# Patient Record
Sex: MALE | Race: WHITE | NOT HISPANIC OR LATINO | Employment: STUDENT | URBAN - METROPOLITAN AREA
[De-identification: names, ages, dates, MRNs, and addresses within clinical notes are randomized per-mention and may not be internally consistent; named-entity substitution may affect disease eponyms.]

---

## 2017-05-01 ENCOUNTER — TELEPHONE (OUTPATIENT)
Dept: FAMILY MEDICINE | Facility: CLINIC | Age: 22
End: 2017-05-01

## 2017-05-01 NOTE — TELEPHONE ENCOUNTER
----- Message from Irena Machado sent at 5/1/2017 10:22 AM CDT -----  Contact: Patient's Dad, Willie Tapia is a patient of Dr. De La Vega and would like to have you see his son. Son is also Medicaid. I explained  is not taking new patients but he insisted I ask. Please call Willie at [phone] to discuss this.

## 2017-05-09 ENCOUNTER — TELEPHONE (OUTPATIENT)
Dept: FAMILY MEDICINE | Facility: CLINIC | Age: 22
End: 2017-05-09

## 2017-05-09 NOTE — TELEPHONE ENCOUNTER
----- Message from Kory Elias sent at 5/9/2017  1:24 PM CDT -----  Contact: pt's father  He's calling in regards to seeing this pt with Medicaid, please advise, [phone] (cell)

## 2017-06-13 PROBLEM — M67.431 GANGLION, RIGHT WRIST: Status: ACTIVE | Noted: 2017-06-13

## 2017-06-21 ENCOUNTER — TELEPHONE (OUTPATIENT)
Dept: FAMILY MEDICINE | Facility: CLINIC | Age: 22
End: 2017-06-21

## 2017-06-21 NOTE — TELEPHONE ENCOUNTER
Mita De Paz canceled todays appt.   Pt informed.   Pt's father was told by MITA De Paz that she will see him as a pt.   Pt is medicaid and I was not able to yandel.   Please help in resching the pt.

## 2017-06-22 PROBLEM — M67.431 GANGLION CYST OF VOLAR ASPECT OF RIGHT WRIST: Status: ACTIVE | Noted: 2017-06-22

## 2017-06-22 NOTE — TELEPHONE ENCOUNTER
Diana, per discussion with Azael yesterday we are unable to work the patient in sooner for a routine appointment. He will have to see her at the first available.

## 2017-09-01 ENCOUNTER — PATIENT OUTREACH (OUTPATIENT)
Dept: ADMINISTRATIVE | Facility: HOSPITAL | Age: 22
End: 2017-09-01

## 2017-09-01 NOTE — LETTER
September 1, 2017    Fredricketta Nguyen  [address] [facility] [address]             [facility]  [address]  [facility] [address]  Phone: [phone] Dear Mr. Nguyen:    [facility] is committed to your overall health.  To help you get the most out of each of your visits, we will review your information to make sure you are up to date on all of your recommended tests and/or procedures.      [facility] has found that you may be due for your fasting cholesterol labs and a pneumonia immunization.     Medicare does not cover all immunizations to be given in the clinic.  Check your benefits to ensure that you do not need to receive your immunizations at the pharmacy.    If you have had any of the above done at another facility, please bring the records or information with you so that your record at [facility] will be complete.  If you would like to schedule any of these, please contact me.    If you are currently taking medication, please bring it with you to your appointment for review.    If you have any questions or concerns, please don't hesitate to call.    Thank you for letting us care for you,  Kathryn Saldaña LPN Clinical Care Coordinator  [facility]  [phone]

## 2018-01-19 ENCOUNTER — TELEPHONE (OUTPATIENT)
Dept: FAMILY MEDICINE | Facility: CLINIC | Age: 23
End: 2018-01-19

## 2018-01-19 NOTE — TELEPHONE ENCOUNTER
----- Message from Agata Jhaveri sent at 1/19/2018  1:16 PM CST -----  Contact: father, Daviezheng Lee  Patient's father, Noemi Calixmonyok calling to schedule an appointment. Father states that Guerline De Paz said she would accept him as a patient. Please call patient at [phone]. Thanks!

## 2018-01-19 NOTE — TELEPHONE ENCOUNTER
Spoke to pt's father, pt has been scheduled two different times, but canceled.   Father states he has [employer].     Member ID [id number]  Health plan # [id number]  Informed him I will have to check with Guerline and call back if she is accepting new pts.

## 2018-01-22 ENCOUNTER — OFFICE VISIT (OUTPATIENT)
Dept: FAMILY MEDICINE | Facility: CLINIC | Age: 23
End: 2018-01-22
Payer: MEDICAID

## 2018-01-22 VITALS
TEMPERATURE: 99 F | BODY MASS INDEX: 22.91 KG/M2 | SYSTOLIC BLOOD PRESSURE: 108 MMHG | RESPIRATION RATE: 12 BRPM | DIASTOLIC BLOOD PRESSURE: 56 MMHG | HEIGHT: 74 IN | WEIGHT: 178.56 LBS | HEART RATE: 56 BPM

## 2018-01-22 DIAGNOSIS — Z00.00 ANNUAL PHYSICAL EXAM: Primary | ICD-10-CM

## 2018-01-22 DIAGNOSIS — Z00.00 LABORATORY EXAM ORDERED AS PART OF ROUTINE GENERAL MEDICAL EXAMINATION: ICD-10-CM

## 2018-01-22 DIAGNOSIS — J45.909 MODERATE ASTHMA, UNSPECIFIED WHETHER COMPLICATED, UNSPECIFIED WHETHER PERSISTENT: ICD-10-CM

## 2018-01-22 PROCEDURE — 99385 PREV VISIT NEW AGE 18-39: CPT | Mod: S$GLB,,, | Performed by: NURSE PRACTITIONER

## 2018-01-22 RX ORDER — BUDESONIDE AND FORMOTEROL FUMARATE DIHYDRATE 80; 4.5 UG/1; UG/1
2 AEROSOL RESPIRATORY (INHALATION) 2 TIMES DAILY
Qty: 10.2 G | Refills: 2 | Status: SHIPPED | OUTPATIENT
Start: 2018-01-22 | End: 2020-01-07 | Stop reason: CLARIF

## 2018-01-22 NOTE — PROGRESS NOTES
"Subjective:       Patient ID: Fredrick Nguyen is a 22 y.o. male.    Chief Complaint: Establish Care    HPI New patient here to establish care. States he is doing well. PMH and PSH reviewed. States he continues to smoke cigarettes daily, no use of Marijuana in a few weeks. He states he has history of asthma but has not had an inhaler in some time now. He denies any specific needs today, states he is just here to establish care. See ROS.    The following portion of the patients history was reviewed and updated as appropriate: allergies, current medications, past medical and surgical history. Past social history and problem list reviewed. Family PMH and Past social history reviewed. Tobacco, Illicit drug use reviewed.     Review of Systems  Constitutional: No fatigue or fever    HENT: no sore throat or nasal congestion. No voice changes    Eyes: No vision changes, blurred vision  Skin: no rashes or lesions  Respiratory:   No SOB, Wheezing, cough  Cardiovascular:   No CP, Palpitations  Gastrointestinal:   No N/V/D. No abdominal pain, weight stable. Appetite good.   Genitourinary:   No dysuria, urgency or frequency. No change in bowels. No blood in stools.   Musculoskeletal:   No joint pain  No change in gait or coordination. .  Neurological:   No dizziness. No headaches  Hematological: No abnormal bruising or bleeding    Psychiatric/Behavioral Negative for suicidal ideas.  Denies feelings of depression. No thoughts of wanting to harm self or others.     Objective:     BP (!) 108/56   Pulse (!) 56   Temp 98.9 °F (37.2 °C) (Oral)   Resp 12   Ht 6' 1.5" (1.867 m)   Wt 81 kg (178 lb 9.2 oz)   BMI 23.24 kg/m²      Physical Exam     Constitutional: oriented to person, place, and time. well-developed and well-nourished.   HENT: normal  Head: Normocephalic.   Eyes: Conjunctivae are normal. Pupils are equal, round, and reactive to light.   Neck: Normal range of motion. Neck supple. No tracheal deviation present. No thyromegaly " present.   Cardiovascular: Normal rate, regular rhythm and normal heart sounds.    Pulmonary/Chest: Effort normal   He has diffuse bilateral end exp wheezing  Abdominal: Soft. Bowel sounds are normal. No distension. There is no tenderness.   Musculoskeletal: Normal range of motion. Gait and coordination normal   Neurological: oriented to person, place, and time.   Skin: Skin is warm and dry. No rashes or lesions  Psychiatric: Normal mood and affect.Behavior is normal. Judgment and thought content normal.   Assessment:       1. Annual physical exam    2. Laboratory exam ordered as part of routine general medical examination    3. Moderate asthma, unspecified whether complicated, unspecified whether persistent        Plan:         Fredrick was seen today for [facility] care.    Diagnoses and all orders for this visit:    Annual physical exam    Laboratory exam ordered as part of routine general medical examination: due for routine labs.  -     Comprehensive metabolic panel; Future  -     Lipid panel; Future    Moderate asthma, unspecified whether complicated, unspecified whether persistent: will place back on inhaler. Use as directed. STOP SMOKING.     Other orders  -     budesonide-formoterol 80-4.5 mcg (SYMBICORT) 80-4.5 mcg/actuation HFAA; Inhale 2 puffs into the lungs 2 (two) times daily. Controller      Healthy diet, exercise  Adequate rest  Adequate hydration  Avoid allergens  Avoid excessive caffeine

## 2018-01-23 ENCOUNTER — TELEPHONE (OUTPATIENT)
Dept: FAMILY MEDICINE | Facility: CLINIC | Age: 23
End: 2018-01-23

## 2018-01-23 DIAGNOSIS — J45.41 MODERATE PERSISTENT ASTHMA WITH ACUTE EXACERBATION: Primary | ICD-10-CM

## 2018-01-23 RX ORDER — ALBUTEROL SULFATE 90 UG/1
2 AEROSOL, METERED RESPIRATORY (INHALATION) EVERY 6 HOURS PRN
Qty: 18 G | Refills: 0 | Status: SHIPPED | OUTPATIENT
Start: 2018-01-23 | End: 2020-01-07 | Stop reason: CLARIF

## 2018-01-23 NOTE — TELEPHONE ENCOUNTER
Spoke to [facility], Insurance limitations states maxium age 13 for symbicort.   No alternatives listed.

## 2018-01-23 NOTE — TELEPHONE ENCOUNTER
Need to find out which inhalers for asthma his insurance will cover, then I will change it.  Please ask the pharmacist or have him call his insurance.

## 2018-01-24 ENCOUNTER — LAB VISIT (OUTPATIENT)
Dept: LAB | Facility: HOSPITAL | Age: 23
End: 2018-01-24
Attending: NURSE PRACTITIONER
Payer: MEDICAID

## 2018-01-24 DIAGNOSIS — Z00.00 LABORATORY EXAM ORDERED AS PART OF ROUTINE GENERAL MEDICAL EXAMINATION: ICD-10-CM

## 2018-01-24 LAB
ALBUMIN SERPL BCP-MCNC: 3.9 G/DL
ALP SERPL-CCNC: 84 U/L
ALT SERPL W/O P-5'-P-CCNC: 10 U/L
ANION GAP SERPL CALC-SCNC: 7 MMOL/L
AST SERPL-CCNC: 12 U/L
BILIRUB SERPL-MCNC: 0.6 MG/DL
BUN SERPL-MCNC: 8 MG/DL
CALCIUM SERPL-MCNC: 9.8 MG/DL
CHLORIDE SERPL-SCNC: 104 MMOL/L
CHOLEST SERPL-MCNC: 114 MG/DL
CHOLEST/HDLC SERPL: 2.9 {RATIO}
CO2 SERPL-SCNC: 29 MMOL/L
CREAT SERPL-MCNC: 0.8 MG/DL
EST. GFR  (AFRICAN AMERICAN): >60 ML/MIN/1.73 M^2
EST. GFR  (NON AFRICAN AMERICAN): >60 ML/MIN/1.73 M^2
GLUCOSE SERPL-MCNC: 105 MG/DL
HDLC SERPL-MCNC: 40 MG/DL
HDLC SERPL: 35.1 %
LDLC SERPL CALC-MCNC: 63 MG/DL
NONHDLC SERPL-MCNC: 74 MG/DL
POTASSIUM SERPL-SCNC: 4.2 MMOL/L
PROT SERPL-MCNC: 7.4 G/DL
SODIUM SERPL-SCNC: 140 MMOL/L
TRIGL SERPL-MCNC: 55 MG/DL

## 2018-01-24 PROCEDURE — 80053 COMPREHEN METABOLIC PANEL: CPT

## 2018-01-24 PROCEDURE — 36415 COLL VENOUS BLD VENIPUNCTURE: CPT | Mod: PO

## 2018-01-24 PROCEDURE — 80061 LIPID PANEL: CPT

## 2018-02-07 ENCOUNTER — TELEPHONE (OUTPATIENT)
Dept: FAMILY MEDICINE | Facility: CLINIC | Age: 23
End: 2018-02-07

## 2018-02-07 NOTE — TELEPHONE ENCOUNTER
"Pt's father stated pt is depressed very sad.  Said his mother came back to town, after being going for many years and left him again.   Pt is speaking, " life is not worth living and that he rather end his life than put up with this stuff",  Father stated pt is blaming himself for all their problems and told him "that he wont have a son long", meaning ending his life.  The father said as we were speaking the pt started to cry and left the room and went outside.   He said they are living in a camper trailer right now.   Informed the father if the pt makes statements of ending life he really needs to call 911.  He stated understanding but dont think it is to that yet, that he is keeping an eye on him.   He is requesting an appointment ASAP.  Please advise.  "

## 2018-02-07 NOTE — TELEPHONE ENCOUNTER
----- Message from RT Regina sent at 2/7/2018  3:16 PM CST -----  Contact: Noemi (Father) [phone]   Noemi (Father) [phone], requesting an appt for the pt to be worked in as soon as possible, appt was added to the wait list, thanks.

## 2018-02-07 NOTE — TELEPHONE ENCOUNTER
I won't be able to work him in until Friday and he needs an extended visit. Can put him in the last two slots of the day but have him come in the morning.  Don't want to wait until end of the day on a Friday.  Until then if he is talking about harming himself he needs to go to [facility] for evaluation and sooner treatment.

## 2018-02-07 NOTE — TELEPHONE ENCOUNTER
Pt father aware pt has appt sched at 9:00 am on Friday. Father advised that if pt has more suicidal thoughts , he needs to call 911 , bring pt to [facility] . Pt father understands .--lp

## 2018-03-19 ENCOUNTER — OFFICE VISIT (OUTPATIENT)
Dept: FAMILY MEDICINE | Facility: CLINIC | Age: 23
End: 2018-03-19
Payer: MEDICAID

## 2018-03-19 VITALS
TEMPERATURE: 99 F | SYSTOLIC BLOOD PRESSURE: 116 MMHG | RESPIRATION RATE: 12 BRPM | DIASTOLIC BLOOD PRESSURE: 58 MMHG | HEART RATE: 60 BPM | BODY MASS INDEX: 23.54 KG/M2 | HEIGHT: 74 IN | WEIGHT: 183.44 LBS

## 2018-03-19 DIAGNOSIS — F32.A DEPRESSION, UNSPECIFIED DEPRESSION TYPE: Primary | ICD-10-CM

## 2018-03-19 DIAGNOSIS — F41.9 ANXIETY: ICD-10-CM

## 2018-03-19 PROCEDURE — 99214 OFFICE O/P EST MOD 30 MIN: CPT | Mod: S$GLB,,, | Performed by: NURSE PRACTITIONER

## 2018-03-19 RX ORDER — ESCITALOPRAM OXALATE 10 MG/1
10 TABLET ORAL DAILY
Qty: 30 TABLET | Refills: 2 | Status: SHIPPED | OUTPATIENT
Start: 2018-03-19 | End: 2018-03-27

## 2018-03-19 NOTE — PROGRESS NOTES
Subjective:       Patient ID: Fredrick Nguyen is a 22 y.o. male.    Chief Complaint: Depression    HPI here for concerns regarding depression. States this is new for him. Had some family issues that triggered this.  He does not have a plan to harm himself. States that his mother who has been absent all of his life has decided that she wants to now be a part of his life. States that she is always putting him down. States that she tells him that he is nothing but trouble. He denies thoughts of killing himself but has the feeling that if something were to happen to him then he would not care. He has never taken medication for depression. States he is not motivated. Feeling more emotional and sad. Crying often. Living with his father. No ETOH or drug use. He feels he would benefit from medication. States he has not smoked any marijuana in months. Feels at times like panic attacks. He does not want to see a therapist at this time. He is working full time. See ROS.    history was reviewed and updated as appropriate: allergies, current medications, past medical and surgical history. Past social history and problem list reviewed. Family PMH and Past social history reviewed. Tobacco, Illicit drug use reviewed.     Review of Systems  Constitutional: No fatigue or fever    HENT: no sore throat or nasal congestion. No voice changes    Eyes: No vision changes, blurred vision  Skin: no rashes or lesions  Respiratory:   No SOB, Wheezing, cough  Cardiovascular:   No CP, Palpitations  Gastrointestinal:   No N/V/D. No abdominal pain, weight stable. Appetite good.   Genitourinary:   No dysuria, urgency or frequency. No change in bowels. No blood in stools.   Musculoskeletal:   No joint pain  No change in gait or coordination. .  Neurological:   No dizziness. No headaches  Hematological: No abnormal bruising or bleeding    Psychiatric/Behavioral Negative for suicidal ideas.  Having increased feelings of depression. No thoughts of wanting  "to harm self or others. crying easily. Worried. See HPI    Objective:     BP (!) 116/58 Comment: manual left arm sitting  Pulse 60   Temp 99 °F (37.2 °C) (Oral)   Resp 12   Ht 6' 1.5" (1.867 m)   Wt 83.2 kg (183 lb 6.8 oz)   BMI 23.87 kg/m²      Physical Exam   Constitutional: oriented to person, place, and time. well-developed and well-nourished.   Cardiovascular: Normal rate, regular rhythm and normal heart sounds.    Pulmonary/Chest: Effort normal and breath sounds normal. No respiratory distress. No wheezes.   Abdominal: Soft. Bowel sounds are normal. No distension. There is no tenderness.   Musculoskeletal: Normal range of motion. Gait and coordination normal   Neurological: oriented to person, place, and time.   Skin: Skin is warm and dry. No rashes or lesions  Psychiatric: Normal mood and affect.Behavior is normal. Judgment and thought content normal. he does not present as a threat to himself or others. He is very direct with what is the cause of all of his emotional upset. States did not have any of these issues until his mother returned.  Assessment:       1. Depression, unspecified depression type    2. Anxiety        Plan:         Ferdrick was seen today for depression.    Diagnoses and all orders for this visit:    Depression, unspecified depression type: will start him on low dose lexapro. Take daily and only as directed. Will follow up in one month for review. If is advised that if increased thoughts of harming himself or others occurs he will stop the medication and let someone know immediately.     Anxiety: lexaro.  Counseling recommended but he wants to defer that for now.    Other orders  -     escitalopram oxalate (LEXAPRO) 10 MG tablet; Take 1 tablet (10 mg total) by mouth once daily.    Continue current medication  Take medications only as prescribed  Healthy diet, exercise  Adequate rest  Adequate hydration  Avoid allergens  Avoid excessive caffeine  "

## 2018-03-26 ENCOUNTER — TELEPHONE (OUTPATIENT)
Dept: FAMILY MEDICINE | Facility: CLINIC | Age: 23
End: 2018-03-26

## 2018-03-26 NOTE — TELEPHONE ENCOUNTER
----- Message from Carolina Chou sent at 3/26/2018 12:29 PM CDT -----  Contact: self  Patient was told to call back and let Dr know if medication is not working     escitalopram oxalate (LEXAPRO) 10 MG tablet    Patient states this medication is not working     Please call to advise [phone]

## 2018-03-26 NOTE — TELEPHONE ENCOUNTER
Spoke with patient. I asked him if he got any relief and he said it might be a bit worse. I explained to him that Guerline was out of the office today and that we would get back with him tomorrow.

## 2018-03-27 ENCOUNTER — TELEPHONE (OUTPATIENT)
Dept: FAMILY MEDICINE | Facility: CLINIC | Age: 23
End: 2018-03-27

## 2018-03-27 RX ORDER — FLUOXETINE HYDROCHLORIDE 20 MG/1
20 CAPSULE ORAL DAILY
Qty: 30 CAPSULE | Refills: 2 | Status: SHIPPED | OUTPATIENT
Start: 2018-03-27 | End: 2018-04-19

## 2018-03-27 NOTE — TELEPHONE ENCOUNTER
Spoke with pt's father and informed him, KATINA De Paz seen his msg and sent new med to [facility]arian and other recommendations.   He verbalized understanding.

## 2018-03-27 NOTE — TELEPHONE ENCOUNTER
----- Message from Anna Jansen sent at 3/27/2018 10:34 AM CDT -----  Contact: Noemi Lee (Father)  Noemi Lee (Father) calling to speak with the Nurse. The patient's depression didn't get better after taking a new medication. He needs something different. Please advise. Call to pod. Call connected to pod. Warm transferred  Call back   Thanks!

## 2018-03-27 NOTE — TELEPHONE ENCOUNTER
"Pt's father stated the depression medication is not working and that the pt is making remarks as:  "I hate my life, I wish I was dead".   Father said he never mentioned suicide thoughts.   Per provider see other msg:    I will change to something different. If he has any thoughts of wanting to harm himself or others then needs to go to the ER.  He needs to call medicaid and ask about mental health providers in the area that take his insurance. He needs to be in counseling.  New medication sent to pharmacy. Let him know it will take several weeks before he will feel any benefits from the medication.  Pt's father verbalized understanding and said he will discuss with pt.  "

## 2018-03-27 NOTE — TELEPHONE ENCOUNTER
I will change to something different. If he has any thoughts of wanting to harm himself or others then needs to go to the ER.  He needs to call medicaid and ask about mental health providers in the area that take his insurance. He needs to be in counseling.  New medication sent to pharmacy. Let him know it will take several weeks before he will feel any benefits from the medication.

## 2018-04-19 ENCOUNTER — OFFICE VISIT (OUTPATIENT)
Dept: FAMILY MEDICINE | Facility: CLINIC | Age: 23
End: 2018-04-19
Payer: MEDICAID

## 2018-04-19 ENCOUNTER — TELEPHONE (OUTPATIENT)
Dept: FAMILY MEDICINE | Facility: CLINIC | Age: 23
End: 2018-04-19

## 2018-04-19 VITALS
DIASTOLIC BLOOD PRESSURE: 46 MMHG | BODY MASS INDEX: 22.12 KG/M2 | WEIGHT: 172.38 LBS | SYSTOLIC BLOOD PRESSURE: 104 MMHG | HEIGHT: 74 IN | HEART RATE: 72 BPM | RESPIRATION RATE: 12 BRPM | TEMPERATURE: 99 F

## 2018-04-19 DIAGNOSIS — F32.2 CURRENT SEVERE EPISODE OF MAJOR DEPRESSIVE DISORDER WITHOUT PSYCHOTIC FEATURES WITHOUT PRIOR EPISODE: Primary | ICD-10-CM

## 2018-04-19 PROCEDURE — 99214 OFFICE O/P EST MOD 30 MIN: CPT | Mod: S$GLB,,, | Performed by: NURSE PRACTITIONER

## 2018-04-19 RX ORDER — SERTRALINE HYDROCHLORIDE 50 MG/1
50 TABLET, FILM COATED ORAL DAILY
Qty: 30 TABLET | Refills: 3 | Status: SHIPPED | OUTPATIENT
Start: 2018-04-19 | End: 2018-06-06

## 2018-04-19 NOTE — PATIENT INSTRUCTIONS
[facility]  [address], [place] [address]  Phone: [phone]  [facility]  [phone]    [facility]  [address], [place] [address]  Phone: [phone]    [facility]  [address]  [place] [address]  Phone: [phone]  Fax: [phone]    [address]  [address]  [place] [address]  Tel: [phone]   Fax: [phone]    [facility]  [address] [place] [address]  Telephone: [phone]    [place] [address] [place] [address]  Phone: [phone]  Fax: [phone]    [facility]  [address], [place] [address]  [phone]

## 2018-04-19 NOTE — TELEPHONE ENCOUNTER
----- Message from Ashley Bowles sent at 4/19/2018 10:05 AM CDT -----  Contact: [phone]  Pt needs a f/u appt in a month 5-19-18 per Guerline.  I am not able to schedule at that time due to his insurance.  Pls call pt to schedule.

## 2018-04-19 NOTE — PROGRESS NOTES
"Subjective:       Patient ID: Fredrick Nguyen is a 22 y.o. male.    Chief Complaint: follow up with depression    HPI here for follow up on depression. He is taking the prozac,. Feels that the depression is getting worse instead of better. Has low self esteem. Crying a lot. He states he is taking it every day. Denies any thoughts of wanting to harm himself. He got in an argument with his best friend yesterday and has been upset about that. He is not working at this time, looking for a job. His father is here with him today and states that he worries and gets upset about trivial things. He feels that he would benefit from see a therapist. Has a lot of issues about his mother.     He wants to try a different depression medication. Will refer to counseling. See ROS.    The following portion of the patients history was reviewed and updated as appropriate: allergies, current medications, past medical and surgical history. Past social history and problem list reviewed. Family PMH and Past social history reviewed. Tobacco, Illicit drug use reviewed.     Review of Systems  Constitutional: No fatigue or fever    Respiratory:   No SOB, Wheezing, cough  Cardiovascular:   No CP, Palpitations  Gastrointestinal:   No N/V/D. No abdominal pain, weight stable. Appetite good.   Genitourinary:   No dysuria, urgency or frequency. No change in bowels. No blood in stools.   Musculoskeletal:   No joint pain  No change in gait or coordination. .  Neurological:   No dizziness. No headaches  Hematological: No abnormal bruising or bleeding    Psychiatric/Behavioral Negative for suicidal ideas. having feelings of depression. No thoughts of wanting to harm self or others.     Objective:     BP (!) 104/46 Comment: maunal left arm sitting, done twice  Pulse 72   Temp 99 °F (37.2 °C) (Oral)   Resp 12   Ht 6' 1.5" (1.867 m)   Wt 78.2 kg (172 lb 6.4 oz)   BMI 22.44 kg/m²      Physical Exam     Constitutional: oriented to person, place, and time. " well-developed and well-nourished.   Cardiovascular: Normal rate, regular rhythm and normal heart sounds.    Pulmonary/Chest: Effort normal and breath sounds normal. No respiratory distress. No wheezes.   Musculoskeletal: Normal range of motion. Gait and coordination normal.   Neurological: oriented to person, place, and time.   Skin: Skin is warm and dry. No rashes or lesions  Psychiatric: Normal mood and affect.Behavior is normal. Judgment and thought content normal. he does not present  As a threat to himself or others.  Assessment:       1. Current severe episode of major depressive disorder without psychotic features without prior episode        Plan:         Fredrick was seen today for follow up with depression.    Diagnoses and all orders for this visit:    Current severe episode of major depressive disorder without psychotic features without prior episode: will refer to psychiatry for evaluation and recommendations. Will change medication to zoloft. Take as directed.   -     Ambulatory referral to Psychiatry    Other orders  -     sertraline (ZOLOFT) 50 MG tablet; Take 1 tablet (50 mg total) by mouth once daily.    OVER 50% OF VISIT SPENT IN REVIEW AND COUNSELING REGARDING DEPRESSION    Continue current medication  Take medications only as prescribed  Healthy diet, exercise  Adequate rest  Adequate hydration  Avoid allergens  Avoid excessive caffeine

## 2018-04-23 ENCOUNTER — TELEPHONE (OUTPATIENT)
Dept: FAMILY MEDICINE | Facility: CLINIC | Age: 23
End: 2018-04-23

## 2018-04-23 NOTE — TELEPHONE ENCOUNTER
----- Message from Lashon Mendiola sent at 4/23/2018 12:47 PM CDT -----  Contact: Roxanne with [facility]  Roxanne with [facility] calling to let Guerline De Paz know that they are not in network with Medicaid, patient needs to be referred to someone in network. Please advise. Thanks.

## 2018-04-23 NOTE — TELEPHONE ENCOUNTER
Let his father know that they need to call  Medicaid and find out which mental health providers are available in his area that take his insurance.

## 2018-05-18 ENCOUNTER — OFFICE VISIT (OUTPATIENT)
Dept: FAMILY MEDICINE | Facility: CLINIC | Age: 23
End: 2018-05-18
Payer: MEDICAID

## 2018-05-18 VITALS
DIASTOLIC BLOOD PRESSURE: 50 MMHG | HEART RATE: 46 BPM | BODY MASS INDEX: 21.46 KG/M2 | OXYGEN SATURATION: 97 % | HEIGHT: 75 IN | TEMPERATURE: 99 F | WEIGHT: 172.63 LBS | SYSTOLIC BLOOD PRESSURE: 102 MMHG

## 2018-05-18 DIAGNOSIS — F41.9 ANXIETY: ICD-10-CM

## 2018-05-18 DIAGNOSIS — F32.A DEPRESSION, UNSPECIFIED DEPRESSION TYPE: Primary | ICD-10-CM

## 2018-05-18 PROCEDURE — 99214 OFFICE O/P EST MOD 30 MIN: CPT | Mod: S$GLB,,, | Performed by: NURSE PRACTITIONER

## 2018-05-21 NOTE — PROGRESS NOTES
"Subjective:       Patient ID: Fredrick Nguyen is a 22 y.o. male.    Chief Complaint: Depression    HPI here for follow up on his depression, anxiety. He states that the medication is finally working well. He has noticed that he is not sitting around worrying and depressed. States he is getting up and being more productive. He is working and hanging out more with his friends. Not crying. He feels he is finally in a good place. He wants to continue with this medication and dosage. He denies thoughts of wanting to harm himself or others. He has an appointment next month with psychiatry. States he is going to keep that appointment just to talk and get some advice. He has no other concerns today. See ROS.    The following portion of the patients history was reviewed and updated as appropriate: allergies, current medications, past medical and surgical history. Past social history and problem list reviewed. Family PMH and Past social history reviewed. Tobacco, Illicit drug use reviewed.     Review of Systems  Constitutional: No fatigue or fever    HENT: no sore throat or nasal congestion. No voice changes    Eyes: No vision changes, blurred vision  Skin: no rashes or lesions  Respiratory:   No SOB, Wheezing, cough  Cardiovascular:   No CP, Palpitations  Gastrointestinal:   No N/V/D. No abdominal pain, weight stable. Appetite good.   Genitourinary:   No dysuria, urgency or frequency. No change in bowels. No blood in stools.   Musculoskeletal:   No joint pain  No change in gait or coordination. .  Neurological:   No dizziness. No headaches  Hematological: No abnormal bruising or bleeding    Psychiatric/Behavioral Negative for suicidal ideas.  Denies feelings of depression. No thoughts of wanting to harm self or others.     Objective:     BP (!) 102/50 (BP Location: Left arm, Patient Position: Sitting, BP Method: Medium (Manual))   Pulse (!) 46   Temp 99 °F (37.2 °C) (Oral)   Ht 6' 2.5" (1.892 m)   Wt 78.3 kg (172 lb 9.6 oz)  "  SpO2 97%   BMI 21.86 kg/m²      Physical Exam     Constitutional: oriented to person, place, and time. well-developed and well-nourished.   Neck: Normal range of motion. Neck supple. No tracheal deviation present. No thyromegaly present.   Cardiovascular: Normal rate, regular rhythm and normal heart sounds.    Pulmonary/Chest: Effort normal and breath sounds normal. No respiratory distress. No wheezes.   Abdominal: Soft. Bowel sounds are normal. No distension. There is no tenderness.   Musculoskeletal: Normal range of motion. No edema.   Neurological: oriented to person, place, and time.   Skin: Skin is warm and dry. No rashes or lesions  Psychiatric: Normal mood and affect.Behavior is normal. Judgment and thought content normal. much better mood. He is doing well. Does not present as a threat to himself or others.  Assessment:       1. Depression, unspecified depression type    2. Anxiety        Plan:         Fredrick was seen today for depression.    Diagnoses and all orders for this visit:    Depression, unspecified depression type: continue current medication and dosage.    Anxiety: doing better. Continue current medication and  Dosage. Keep appt with psychiatry.     Continue current medication  Take medications only as prescribed  Healthy diet, exercise  Adequate rest  Adequate hydration  Avoid allergens  Avoid excessive caffeine

## 2018-06-06 ENCOUNTER — OFFICE VISIT (OUTPATIENT)
Dept: FAMILY MEDICINE | Facility: CLINIC | Age: 23
End: 2018-06-06
Payer: MEDICAID

## 2018-06-06 VITALS
WEIGHT: 167.13 LBS | TEMPERATURE: 98 F | BODY MASS INDEX: 20.78 KG/M2 | HEART RATE: 52 BPM | HEIGHT: 75 IN | SYSTOLIC BLOOD PRESSURE: 124 MMHG | DIASTOLIC BLOOD PRESSURE: 62 MMHG

## 2018-06-06 DIAGNOSIS — K29.70 VIRAL GASTRITIS: Primary | ICD-10-CM

## 2018-06-06 DIAGNOSIS — R53.83 FATIGUE, UNSPECIFIED TYPE: ICD-10-CM

## 2018-06-06 DIAGNOSIS — J02.9 SORE THROAT: ICD-10-CM

## 2018-06-06 DIAGNOSIS — R11.2 NAUSEA AND VOMITING, INTRACTABILITY OF VOMITING NOT SPECIFIED, UNSPECIFIED VOMITING TYPE: ICD-10-CM

## 2018-06-06 DIAGNOSIS — R19.7 DIARRHEA, UNSPECIFIED TYPE: ICD-10-CM

## 2018-06-06 PROBLEM — R00.1 BRADYCARDIA: Status: ACTIVE | Noted: 2018-06-06

## 2018-06-06 PROBLEM — R11.0 NAUSEA: Status: ACTIVE | Noted: 2018-06-06

## 2018-06-06 PROBLEM — R10.9 ABDOMINAL PAIN: Status: ACTIVE | Noted: 2018-06-06

## 2018-06-06 PROBLEM — E86.0 DEHYDRATION: Status: ACTIVE | Noted: 2018-06-06

## 2018-06-06 PROBLEM — A08.4 VIRAL GASTROENTERITIS: Status: ACTIVE | Noted: 2018-06-06

## 2018-06-06 LAB
CTP QC/QA: YES
CTP QC/QA: YES
HETEROPH AB SER QL: NEGATIVE
S PYO RRNA THROAT QL PROBE: NEGATIVE

## 2018-06-06 PROCEDURE — 86308 HETEROPHILE ANTIBODY SCREEN: CPT | Mod: QW,,, | Performed by: NURSE PRACTITIONER

## 2018-06-06 PROCEDURE — 87880 STREP A ASSAY W/OPTIC: CPT | Mod: QW,,, | Performed by: NURSE PRACTITIONER

## 2018-06-06 PROCEDURE — 99214 OFFICE O/P EST MOD 30 MIN: CPT | Mod: S$GLB,,, | Performed by: NURSE PRACTITIONER

## 2018-06-06 RX ORDER — ONDANSETRON 2 MG/ML
4 INJECTION INTRAMUSCULAR; INTRAVENOUS ONCE
Status: SHIPPED | OUTPATIENT
Start: 2018-06-06

## 2018-06-06 RX ORDER — ONDANSETRON 2 MG/ML
4 INJECTION INTRAMUSCULAR; INTRAVENOUS ONCE
Status: DISCONTINUED | OUTPATIENT
Start: 2018-06-06 | End: 2018-06-06 | Stop reason: HOSPADM

## 2018-06-06 RX ADMIN — ONDANSETRON 4 MG: 2 INJECTION INTRAMUSCULAR; INTRAVENOUS at 10:06

## 2018-06-06 NOTE — PROGRESS NOTES
"Subjective:       Patient ID: Fredrick Nguyen is a 22 y.o. male.    Chief Complaint: Emesis    HPI started yesterday with stomach cramping and mild sore throat. Had cramping all night long and some diarrhea. Then this morning he ate breakfast and that started him vomiting. States shaky, cold. Throat is better, just feels irritated. He does not have a fever. He is pale and appears not to feel well. See ROS.    The following portion of the patients history was reviewed and updated as appropriate: allergies, current medications, past medical and surgical history. Past social history and problem list reviewed. Family PMH and Past social history reviewed. Tobacco, Illicit drug use reviewed.     Review of Systems   Constitutional: Positive for chills and fatigue. Negative for fever.   HENT: Positive for sore throat.    Eyes: Negative for visual disturbance.   Respiratory: Negative for cough, shortness of breath and wheezing.    Cardiovascular: Negative for chest pain and palpitations.   Gastrointestinal: Positive for abdominal pain (generalized cramping), diarrhea, nausea and vomiting. Negative for constipation.   Musculoskeletal: Positive for myalgias.   Neurological: Negative for dizziness and headaches.       Objective:     /62   Pulse (!) 52   Temp 98.3 °F (36.8 °C) (Tympanic)   Ht 6' 2.5" (1.892 m)   Wt 75.8 kg (167 lb 1.7 oz)   BMI 21.17 kg/m²      Physical Exam     Constitutional: oriented to person, place, and time. well-developed and well-nourished. Pale, appears not to feel well.  HENT: normal nares. Throat with mild erythema but no exudate. Canals and TM normal  Head: Normocephalic.   Eyes: Conjunctivae are normal. Pupils are equal, round, and reactive to light.   Neck: Normal range of motion. Neck supple. No tracheal deviation present. No thyromegaly present. no enlarged or tender anterior cervical lymph nodes.  Cardiovascular: Normal rate, regular rhythm and normal heart sounds.    Pulmonary/Chest: " Effort normal and breath sounds normal. No respiratory distress. No wheezes.   Abdominal: Soft. Bowel sounds are normal. No distension. There is generalized tenderness. no rebound  Musculoskeletal: Normal range of motion. Gait and coordination normal.   Neurological: oriented to person, place, and time.   Skin: Skin is warm and dry. No rashes or lesions    Assessment:       1. Viral gastritis    2. Nausea and vomiting, intractability of vomiting not specified, unspecified vomiting type    3. Diarrhea, unspecified type    4. Sore throat    5. Fatigue, unspecified type        Plan:         Fredrick was seen today for emesis.    Diagnoses and all orders for this visit:    Viral gastritis: gave zofran IM for the nausea/vomiting. He is advised to rest, hydrate. Try to eat crackers, bland diet and advance as tolerated. If not improving over the next 24 hours let me know. He has zofran at home. Advised he can take that every 4 hours.    Nausea and vomiting, intractability of vomiting not specified, unspecified vomiting type  -     ondansetron injection 4 mg; Inject 4 mg into the muscle once.  -     ondansetron injection 4 mg; Inject 4 mg into the muscle once.    Diarrhea, unspecified type: do not want to give any  Medication for the diarrhea.     Sore throat  -     POCT rapid strep A: NEGATIVE    Results for orders placed or performed in visit on 06/06/18   POCT rapid strep A   Result Value Ref Range    Rapid Strep A Screen Negative Negative     Acceptable Yes    POCT Infectious mononucleosis antibody   Result Value Ref Range    Monospot Negative Negative     Acceptable Yes        Fatigue, unspecified type  -     POCT Infectious mononucleosis antibody: NEGATIVE      Continue current medication  Take medications only as prescribed  Healthy diet  Adequate rest  Adequate hydration  Avoid allergens  Avoid excessive caffeine

## 2018-06-07 PROBLEM — K52.9 COLITIS PRESUMED INFECTIOUS: Status: ACTIVE | Noted: 2018-06-07

## 2018-06-14 ENCOUNTER — OFFICE VISIT (OUTPATIENT)
Dept: FAMILY MEDICINE | Facility: CLINIC | Age: 23
End: 2018-06-14
Payer: MEDICAID

## 2018-06-14 ENCOUNTER — TELEPHONE (OUTPATIENT)
Dept: FAMILY MEDICINE | Facility: CLINIC | Age: 23
End: 2018-06-14

## 2018-06-14 VITALS
DIASTOLIC BLOOD PRESSURE: 64 MMHG | HEIGHT: 75 IN | OXYGEN SATURATION: 99 % | BODY MASS INDEX: 20.47 KG/M2 | TEMPERATURE: 99 F | WEIGHT: 164.63 LBS | RESPIRATION RATE: 18 BRPM | HEART RATE: 57 BPM | SYSTOLIC BLOOD PRESSURE: 115 MMHG

## 2018-06-14 DIAGNOSIS — E86.0 DEHYDRATION: ICD-10-CM

## 2018-06-14 DIAGNOSIS — Z23 IMMUNIZATION DUE: ICD-10-CM

## 2018-06-14 DIAGNOSIS — A09 INFECTIOUS COLITIS: ICD-10-CM

## 2018-06-14 DIAGNOSIS — Z09 HOSPITAL DISCHARGE FOLLOW-UP: Primary | ICD-10-CM

## 2018-06-14 DIAGNOSIS — J30.9 ALLERGIC RHINITIS, UNSPECIFIED SEASONALITY, UNSPECIFIED TRIGGER: ICD-10-CM

## 2018-06-14 PROCEDURE — 90670 PCV13 VACCINE IM: CPT | Mod: S$GLB,,, | Performed by: NURSE PRACTITIONER

## 2018-06-14 PROCEDURE — 99214 OFFICE O/P EST MOD 30 MIN: CPT | Mod: 25,S$GLB,, | Performed by: NURSE PRACTITIONER

## 2018-06-14 PROCEDURE — 90471 IMMUNIZATION ADMIN: CPT | Mod: S$GLB,,, | Performed by: NURSE PRACTITIONER

## 2018-06-14 RX ORDER — MONTELUKAST SODIUM 10 MG/1
TABLET ORAL
Qty: 30 TABLET | Refills: 11 | Status: SHIPPED | OUTPATIENT
Start: 2018-06-14

## 2018-06-14 NOTE — PROGRESS NOTES
"Subjective:       Patient ID: Fredrick Nguyen is a 22 y.o. male.    Chief Complaint: Hospital Follow Up, [facility]    HPI Patient was seen on the 6 for nausea and vomiting.  After his visit here he went to the emergency room and was admitted for dehydration, intractable vomiting, viral gastroenteritis, infectious colitis..  After 2 days he was discharged.  He was rehydrated.  All workup in the hospital was negative.  It was felt he possibly could have had some infectious colitis so was placed on antibiotics.  He is here today for follow-up. All of his symptoms have resolved. He completed the antibiotics that were given.     Having itching to his left eye for several days. Draining, watery. Mild crusting. No vision changes. Sun makes it worse. No eye drops used. Sneezing, runny nose.     The following portion of the patients history was reviewed and updated as appropriate: allergies, current medications, past medical and surgical history. Past social history and problem list reviewed. Family PMH and Past social history reviewed. Tobacco, Illicit drug use reviewed.     Review of Systems   Constitutional: Negative for fatigue and fever.   HENT: Positive for rhinorrhea and sneezing. Negative for sinus pain, sinus pressure and sore throat.    Eyes: Positive for redness and itching. Negative for visual disturbance.   Respiratory: Negative for cough, shortness of breath and wheezing.    Cardiovascular: Negative for chest pain and palpitations.   Gastrointestinal: Negative for abdominal pain, blood in stool, diarrhea, nausea and vomiting.   Musculoskeletal: Negative.    Neurological: Negative for headaches.       Objective:     /64 (BP Location: Left arm, Patient Position: Sitting, BP Method: Large (Manual))   Pulse (!) 57   Temp 98.5 °F (36.9 °C) (Oral)   Resp 18   Ht 6' 3" (1.905 m)   Wt 74.7 kg (164 lb 9.6 oz)   SpO2 99%   BMI 20.57 kg/m²      Physical Exam     Constitutional: oriented to person, place, and time. " well-developed and well-nourished.   HENT: normal nares.  No sinus area tenderness.  Throat clear  Head: Normocephalic.   Eyes: Conjunctivae are normal on right.  Mild irritation and crusting on the left. Pupils are equal, round, and reactive to light.   Neck: Normal range of motion. Neck supple. No tracheal deviation present. No thyromegaly present.   Cardiovascular: Normal rate, regular rhythm and normal heart sounds.    Pulmonary/Chest: Effort normal and breath sounds normal. No respiratory distress. No wheezes.   Abdominal: Soft. Bowel sounds are normal. No distension. There is no tenderness.   Musculoskeletal: Normal range of motion. Gait normal   Assessment:       1. Hospital discharge follow-up    2. Dehydration    3. Infectious colitis    4. Allergic rhinitis, unspecified seasonality, unspecified trigger    5. Immunization due        Plan:         Fredrick was seen today for hospital follow up, st.    Diagnoses and all orders for this visit:    Hospital discharge follow-up    Dehydration: rehydrated.      Infectious colitis: he has completed the antibiotics given. Feeling much better.     Allergic rhinitis, unspecified seasonality, unspecified trigger: will start him back on singulair daily. Take as directed.    Immunization due: he is a smoker. Due for pneumonia vaccine  -     Pneumococcal Conjugate Vaccine (13 Valent) (IM)    Other orders  -     montelukast (SINGULAIR) 10 mg tablet; One tablet daily. Dx: 477.9    Continue current medication  Take medications only as prescribed  Healthy diet, exercise  Adequate rest  Adequate hydration  Avoid allergens  Avoid excessive caffeine

## 2018-06-14 NOTE — PROGRESS NOTES
Notified rx failed to go through to [facility]. Called it in for Singulair 10mg #30 with 11 rf

## 2018-06-14 NOTE — TELEPHONE ENCOUNTER
----- Message from Kelsey Ledesma sent at 6/14/2018 11:18 AM CDT -----  Contact: [facility] calling states pt is there for a new prescription given today and they have not received it would like a call right away.(I called the office but didn't get an answer)...  JESSA [facility] - [facility] [id number] [facility] [address]  Phone: [phone] Fax: [phone]

## 2018-11-09 ENCOUNTER — OFFICE VISIT (OUTPATIENT)
Dept: FAMILY MEDICINE | Facility: CLINIC | Age: 23
End: 2018-11-09
Payer: MEDICAID

## 2018-11-09 VITALS
SYSTOLIC BLOOD PRESSURE: 100 MMHG | HEIGHT: 75 IN | DIASTOLIC BLOOD PRESSURE: 60 MMHG | TEMPERATURE: 99 F | HEART RATE: 64 BPM | WEIGHT: 181.81 LBS | BODY MASS INDEX: 22.61 KG/M2 | RESPIRATION RATE: 20 BRPM

## 2018-11-09 DIAGNOSIS — J02.9 SORE THROAT: Primary | ICD-10-CM

## 2018-11-09 DIAGNOSIS — R50.9 FEVER, UNSPECIFIED FEVER CAUSE: ICD-10-CM

## 2018-11-09 DIAGNOSIS — J01.01 ACUTE RECURRENT MAXILLARY SINUSITIS: ICD-10-CM

## 2018-11-09 DIAGNOSIS — J45.41 MODERATE PERSISTENT REACTIVE AIRWAY DISEASE WITH ACUTE EXACERBATION: ICD-10-CM

## 2018-11-09 LAB
CTP QC/QA: YES
CTP QC/QA: YES
FLUAV AG NPH QL: NEGATIVE
FLUBV AG NPH QL: NEGATIVE
S PYO RRNA THROAT QL PROBE: NEGATIVE

## 2018-11-09 PROCEDURE — 99214 OFFICE O/P EST MOD 30 MIN: CPT | Mod: 25,S$GLB,, | Performed by: NURSE PRACTITIONER

## 2018-11-09 PROCEDURE — 90686 IIV4 VACC NO PRSV 0.5 ML IM: CPT | Mod: S$GLB,,, | Performed by: NURSE PRACTITIONER

## 2018-11-09 PROCEDURE — 90471 IMMUNIZATION ADMIN: CPT | Mod: S$GLB,,, | Performed by: NURSE PRACTITIONER

## 2018-11-09 PROCEDURE — 87880 STREP A ASSAY W/OPTIC: CPT | Mod: QW,,, | Performed by: NURSE PRACTITIONER

## 2018-11-09 PROCEDURE — 87804 INFLUENZA ASSAY W/OPTIC: CPT | Mod: QW,,, | Performed by: NURSE PRACTITIONER

## 2018-11-09 RX ORDER — PROMETHAZINE HYDROCHLORIDE AND DEXTROMETHORPHAN HYDROBROMIDE 6.25; 15 MG/5ML; MG/5ML
5 SYRUP ORAL EVERY 4 HOURS PRN
Qty: 240 ML | Refills: 0 | Status: SHIPPED | OUTPATIENT
Start: 2018-11-09 | End: 2018-11-19

## 2018-11-09 RX ORDER — AMOXICILLIN AND CLAVULANATE POTASSIUM 875; 125 MG/1; MG/1
1 TABLET, FILM COATED ORAL EVERY 12 HOURS
Qty: 20 TABLET | Refills: 0 | Status: SHIPPED | OUTPATIENT
Start: 2018-11-09 | End: 2020-01-07 | Stop reason: CLARIF

## 2018-11-09 RX ORDER — PREDNISONE 20 MG/1
TABLET ORAL
Qty: 10 TABLET | Refills: 0 | Status: SHIPPED | OUTPATIENT
Start: 2018-11-09 | End: 2020-01-07 | Stop reason: CLARIF

## 2018-11-09 NOTE — PROGRESS NOTES
"Subjective:       Patient ID: Fredrick Nguyen is a 23 y.o. male.    Chief Complaint: Cough; Sore Throat; and Sinus Problem    HPI has had coughing over a week now. Onset two days ago with fever, chills, sore throat, PND, productive cough. Taking OTC delsym. Wheezing, using his inhaler. Achy all over. Muscles achy. Sinus congestion and pain. Sinus pressure headache. He is a smoker. States this has slowly been getting worse over the past 10 days. See ROS.    The following portion of the patients history was reviewed and updated as appropriate: allergies, current medications, past medical and surgical history. Past social history and problem list reviewed. Family PMH and Past social history reviewed. Tobacco, Illicit drug use reviewed.     Review of Systems   Constitutional: Positive for appetite change (decreased), chills, fatigue and fever.   HENT: Positive for congestion, sinus pressure, sinus pain and sore throat.    Eyes: Negative for visual disturbance.   Respiratory: Positive for cough, chest tightness and wheezing.    Cardiovascular: Negative for chest pain and palpitations.   Gastrointestinal: Negative for abdominal pain, diarrhea, nausea and vomiting.   Musculoskeletal: Positive for myalgias.   Neurological: Positive for headaches (sinus pressure).       Objective:     /60   Pulse 64   Temp 99.4 °F (37.4 °C) (Oral)   Resp 20   Ht 6' 3" (1.905 m)   Wt 82.5 kg (181 lb 12.8 oz)   BMI 22.72 kg/m²      Physical Exam  Constitutional:  well-developed and well-nourished. Oriented to Person, place and time.   Head: Normocephalic.   Ears: Canals without erythema or cerumen impactions. Mild air and /fluid levels. No bulging bilaterally.  Nose: Rhinorrhea and sinus tenderness present over maxillary sinus area.   Mouth/Throat: Uvula is midline and mucous membranes are normal. Posterior oropharyngeal erythema present. PND to posterior pharynx. No exudate  Skin: clammy, pale  Eyes: Conjunctivae are normal. Pupils " are equal, round, and reactive to light.   Neck: Normal range of motion. Neck supple. mild inflammation and tenderness to anterior cervical lymph nodes  Cardiovascular: Normal rate and regular rhythm.  No murmurs or gallops.   Pulmonary/Chest: Effort normal . Wheezing bilaterally with congestion.   Musculoskeletal: Normal range of motion. gait and coordination normal.   Assessment:       1. Sore throat    2. Fever, unspecified fever cause    3. Moderate persistent reactive airway disease with acute exacerbation    4. Acute recurrent maxillary sinusitis        Plan:         Fredrick was seen today for cough, sore throat and sinus problem.    Diagnoses and all orders for this visit:    Sore throat: strep negative.  -     POCT rapid strep A    Fever, unspecified fever cause: flu negative  -     POCT Influenza A/B    Moderate persistent reactive airway disease with acute exacerbation: prednisone taper, take as directed.     Acute recurrent maxillary sinusitis: antibiotics. Take as directed. Eat before taking. Complete full course of medication.     Other orders  -     Influenza - Quadrivalent (3 years & older) (PF)  -     amoxicillin-clavulanate 875-125mg (AUGMENTIN) 875-125 mg per tablet; Take 1 tablet by mouth every 12 (twelve) hours.  -     predniSONE (DELTASONE) 20 MG tablet; Take two tablets in am for 3 days, then one tablet for 3 days then 1/2 for 2 days  -     promethazine-dextromethorphan (PROMETHAZINE-DM) 6.25-15 mg/5 mL Syrp; Take 5 mLs by mouth every 4 (four) hours as needed. Do not take and drive if sedation occurs.      Take medications only as prescribed  Healthy diet  Adequate rest  Adequate hydration  Avoid allergens  Avoid excessive caffeine

## 2018-11-15 ENCOUNTER — TELEPHONE (OUTPATIENT)
Dept: FAMILY MEDICINE | Facility: CLINIC | Age: 23
End: 2018-11-15

## 2018-11-15 NOTE — TELEPHONE ENCOUNTER
----- Message from Fay Malone sent at 11/15/2018  1:00 PM CST -----  Please call Dewayne / Davie Lee [phone] / states he would like to discuss his son mental state /within the next two hours please

## 2018-11-15 NOTE — TELEPHONE ENCOUNTER
Returned call to pts Father, Davie. LM for him to return call to clinic. Callback number provided on .    Could not confirm that pt's Father is listed on involvemet of care.

## 2020-01-08 PROBLEM — R19.7 VOMITING AND DIARRHEA: Status: ACTIVE | Noted: 2020-01-08

## 2020-01-08 PROBLEM — K52.9 ENTERITIS: Status: ACTIVE | Noted: 2020-01-08

## 2020-01-08 PROBLEM — R11.10 VOMITING AND DIARRHEA: Status: ACTIVE | Noted: 2020-01-08
